# Patient Record
(demographics unavailable — no encounter records)

---

## 2025-06-09 NOTE — HISTORY OF PRESENT ILLNESS
[Vasectomy (partner)] : has a partner with a vasectomy [Y] : Patient is sexually active [Regular Cycle Intervals] : periods have been regular [Frequency: Q ___ days] : menstrual periods occur approximately every [unfilled] days [Menarche Age: ____] : age at menarche was [unfilled] [FreeTextEntry1] : Natalya is a 40-year-old -0-2-3 LMP 2025 here for GYN annual  Previously seen by [facility] provider in .  Last seen by Dr. Tirso Telles  for routine annual No acute GYN complaints at this time.  Reports regular menses monthly.  Denies any intermenstrual bleeding or spotting.  Denies any pelvic pain.  Reports occasional nocturia, believes related to drinking before bed. Sexually active with male partner,  had vasectomy in  Had early screening mammogram in 2024, Tyrer-Cuzick score 22% Works as CRNA at [employer]  PCP: Dr. Davis  Medical records in chart [PGHxTotal] : 5 [PGHxPremature] : 0 [[facility]] : 3 [[facility] Center[facility]] : 3 [PGHxAbortions] : 0 [PGHxABInduced] : 0 [PGHxEctopic] : 0 [PGHxABSpont] : 2 [PGHxMultBirths] : 0

## 2025-06-09 NOTE — PROCEDURE
[Cervical Pap Smear] : cervical Pap smear [GC & Chlamydia via Pap] : GC & Chlamydia via Pap [Tolerated Well] : the patient tolerated the procedure well [Liquid Base] : liquid base [No Complications] : there were no complications

## 2025-06-09 NOTE — DISCUSSION/SUMMARY
[FreeTextEntry1] : Natalya is a 40-year-old -0-2-3 LMP 2025 here for GYN annual   #Well Woman Visit  1. Nutrition/Activity: The benefits of physical activity and balanced diet.  2. Health Screening: She was informed of the benefits of a screening Mammo/Pap. - Cervix: We reviewed ASCCP/ACOG guidelines for pap smear screening. PAP collected today. - Elevated Tyrer-Cuzick score, recommendation for screening breast MRI.  Order placed 3. Sex Health: The importance of safe-sex practices was discussed with the patient. STD screening was offered to patient, she accepts g/c testing 4. Contraception: does not desire at this time. 5.  History of preeclampsia, seen by cardio OB.  No further interventions      She verbalized understanding and agreement with above counseling regarding differential diagnosis, evaluation, and plan. She was given time for questions/concerns which were all answered to her apparent satisfaction.    RTO in 1 yr for annual

## 2025-06-09 NOTE — PHYSICAL EXAM
[Chaperoned Physical Exam] : A chaperone was present in the examining room during all aspects of the physical examination. [MA] : MA [FreeTextEntry1] : Lynda [Appropriately responsive] : appropriately responsive [Alert] : alert [No Acute Distress] : no acute distress [Soft] : soft [Non-tender] : non-tender [No Lesions] : no lesions [No Mass] : no mass [Oriented x3] : oriented x3 [Examination Of The Breasts] : a normal appearance [No Masses] : no breast masses were palpable [Labia Majora] : normal [Labia Minora] : normal [Normal] : normal [Uterine Adnexae] : normal

## 2025-06-09 NOTE — COUNSELING
[Nutrition/ Exercise/ Weight Management] : nutrition, exercise, weight management [Vitamins/Supplements] : vitamins/supplements [Body Image] : body image [Confidentiality] : confidentiality [STD (testing, results, tx)] : STD (testing, results, tx)